# Patient Record
Sex: FEMALE | Race: WHITE | NOT HISPANIC OR LATINO | URBAN - METROPOLITAN AREA
[De-identification: names, ages, dates, MRNs, and addresses within clinical notes are randomized per-mention and may not be internally consistent; named-entity substitution may affect disease eponyms.]

---

## 2020-07-21 ENCOUNTER — LAB OUTSIDE AN ENCOUNTER (OUTPATIENT)
Dept: URBAN - METROPOLITAN AREA TELEHEALTH 2 | Facility: TELEHEALTH | Age: 65
End: 2020-07-21

## 2020-07-21 ENCOUNTER — OFFICE VISIT (OUTPATIENT)
Dept: URBAN - METROPOLITAN AREA TELEHEALTH 2 | Facility: TELEHEALTH | Age: 65
End: 2020-07-21
Payer: MEDICARE

## 2020-07-21 ENCOUNTER — OFFICE VISIT (OUTPATIENT)
Dept: URBAN - METROPOLITAN AREA CLINIC 40 | Facility: CLINIC | Age: 65
End: 2020-07-21

## 2020-07-21 DIAGNOSIS — Z86.010 HISTORY OF COLON POLYPS: ICD-10-CM

## 2020-07-21 DIAGNOSIS — K76.0 FATTY LIVER: ICD-10-CM

## 2020-07-21 PROCEDURE — G8417 CALC BMI ABV UP PARAM F/U: HCPCS | Performed by: INTERNAL MEDICINE

## 2020-07-21 PROCEDURE — 99203 OFFICE O/P NEW LOW 30 MIN: CPT | Performed by: INTERNAL MEDICINE

## 2020-07-21 PROCEDURE — 3017F COLORECTAL CA SCREEN DOC REV: CPT | Performed by: INTERNAL MEDICINE

## 2020-07-21 PROCEDURE — 1036F TOBACCO NON-USER: CPT | Performed by: INTERNAL MEDICINE

## 2020-07-21 PROCEDURE — G9903 PT SCRN TBCO ID AS NON USER: HCPCS | Performed by: INTERNAL MEDICINE

## 2020-07-21 PROCEDURE — G8427 DOCREV CUR MEDS BY ELIG CLIN: HCPCS | Performed by: INTERNAL MEDICINE

## 2020-07-21 RX ORDER — SODIUM, POTASSIUM,MAG SULFATES 17.5-3.13G
AS DIRECTED SOLUTION, RECONSTITUTED, ORAL ORAL
OUTPATIENT
Start: 2020-07-21

## 2020-07-21 NOTE — HPI-TODAY'S VISIT:
Patient has a history of colon polyps, and returns now to schedule her surveillance colonoscopy.  She underwent her last colonoscopy on 10/11/2010 where a small hyperplastic sigmoid polyp was removed, and sigmoid diverticulosis was found.  Overall she has been feeling very well she denies abdominal pain, and her bowel movements have been normal.  She denies any rectal bleeding or family history of colon cancer.  She states in the past she was diagnosed with a fatty liver.  Review of recent lab work from 3/9/2020 shows a normal liver panel with an AST 23 ALT 24 alkaline phosphatase 49 and a bilirubin of 0.4.  She has had no recent imaging studies.

## 2020-08-11 ENCOUNTER — TELEPHONE ENCOUNTER (OUTPATIENT)
Dept: URBAN - METROPOLITAN AREA CLINIC 40 | Facility: CLINIC | Age: 65
End: 2020-08-11

## 2020-08-17 ENCOUNTER — OFFICE VISIT (OUTPATIENT)
Dept: URBAN - METROPOLITAN AREA SURGERY CENTER 30 | Facility: SURGERY CENTER | Age: 65
End: 2020-08-17

## 2020-08-17 ENCOUNTER — OFFICE VISIT (OUTPATIENT)
Dept: URBAN - METROPOLITAN AREA SURGERY CENTER 30 | Facility: SURGERY CENTER | Age: 65
End: 2020-08-17
Payer: MEDICARE

## 2020-08-17 DIAGNOSIS — Z86.010 H/O ADENOMATOUS POLYP OF COLON: ICD-10-CM

## 2020-08-17 PROCEDURE — G9936 PMH PLYP/NEO CO/RECT/JUN/ANS: HCPCS | Performed by: INTERNAL MEDICINE

## 2020-08-17 PROCEDURE — G8907 PT DOC NO EVENTS ON DISCHARG: HCPCS | Performed by: INTERNAL MEDICINE

## 2020-08-17 PROCEDURE — G0105 COLORECTAL SCRN; HI RISK IND: HCPCS | Performed by: INTERNAL MEDICINE

## 2020-09-01 ENCOUNTER — DASHBOARD ENCOUNTERS (OUTPATIENT)
Age: 65
End: 2020-09-01

## 2020-09-01 ENCOUNTER — OFFICE VISIT (OUTPATIENT)
Dept: URBAN - METROPOLITAN AREA TELEHEALTH 2 | Facility: TELEHEALTH | Age: 65
End: 2020-09-01
Payer: MEDICARE

## 2020-09-01 DIAGNOSIS — Z86.010 HISTORY OF COLON POLYPS: ICD-10-CM

## 2020-09-01 DIAGNOSIS — K76.0 FATTY LIVER: ICD-10-CM

## 2020-09-01 PROBLEM — 197321007: Status: ACTIVE | Noted: 2020-07-21

## 2020-09-01 PROBLEM — 428283002: Status: ACTIVE | Noted: 2020-07-21

## 2020-09-01 PROCEDURE — 1036F TOBACCO NON-USER: CPT | Performed by: INTERNAL MEDICINE

## 2020-09-01 PROCEDURE — 99213 OFFICE O/P EST LOW 20 MIN: CPT | Performed by: INTERNAL MEDICINE

## 2020-09-01 PROCEDURE — G9903 PT SCRN TBCO ID AS NON USER: HCPCS | Performed by: INTERNAL MEDICINE

## 2020-09-01 PROCEDURE — 3017F COLORECTAL CA SCREEN DOC REV: CPT | Performed by: INTERNAL MEDICINE

## 2020-09-01 PROCEDURE — G8427 DOCREV CUR MEDS BY ELIG CLIN: HCPCS | Performed by: INTERNAL MEDICINE

## 2020-09-01 PROCEDURE — G8417 CALC BMI ABV UP PARAM F/U: HCPCS | Performed by: INTERNAL MEDICINE

## 2020-09-01 RX ORDER — SODIUM, POTASSIUM,MAG SULFATES 17.5-3.13G
AS DIRECTED SOLUTION, RECONSTITUTED, ORAL ORAL
Status: ACTIVE | COMMUNITY
Start: 2020-07-21

## 2020-09-01 NOTE — HPI-TODAY'S VISIT:
Patient has a history of colon polyps, and returns now to schedule her surveillance colonoscopy.  She underwent her last colonoscopy on 10/11/2010 where a small hyperplastic sigmoid polyp was removed, and sigmoid diverticulosis was found.  Overall she has been feeling very well she denies abdominal pain, and her bowel movements have been normal.  She denies any rectal bleeding or family history of colon cancer.  She states in the past she was diagnosed with a fatty liver.  Review of recent lab work from 3/9/2020 shows a normal liver panel with an AST 23 ALT 24 alkaline phosphatase 49 and a bilirubin of 0.4.  She has had no recent imaging studies. Patient underwent her colonoscopy on 8/17/2020 where only few sigmoid diverticula  were present.  She underwent a right upper quadrant ultrasound on 8/14/2020 which showed hepatic steatosis, was otherwise normal patient continues to feel fine, denies abdominal pain, her bowel movements are normal.  She continues to try to lose weight as advised.

## 2025-01-02 ENCOUNTER — OFFICE VISIT (OUTPATIENT)
Dept: URBAN - METROPOLITAN AREA CLINIC 40 | Facility: CLINIC | Age: 70
End: 2025-01-02

## 2025-01-02 ENCOUNTER — LAB OUTSIDE AN ENCOUNTER (OUTPATIENT)
Dept: URBAN - METROPOLITAN AREA CLINIC 40 | Facility: CLINIC | Age: 70
End: 2025-01-02

## 2025-01-02 VITALS
DIASTOLIC BLOOD PRESSURE: 80 MMHG | SYSTOLIC BLOOD PRESSURE: 138 MMHG | HEART RATE: 85 BPM | BODY MASS INDEX: 27.11 KG/M2 | WEIGHT: 153 LBS | HEIGHT: 63 IN | TEMPERATURE: 98.7 F

## 2025-01-02 DIAGNOSIS — R19.7 DIARRHEA, UNSPECIFIED TYPE: ICD-10-CM

## 2025-01-02 DIAGNOSIS — K76.0 FATTY LIVER: ICD-10-CM

## 2025-01-02 DIAGNOSIS — Z86.0102 HISTORY OF HYPERPLASTIC POLYP OF COLON: ICD-10-CM

## 2025-01-02 PROCEDURE — 99204 OFFICE O/P NEW MOD 45 MIN: CPT | Performed by: INTERNAL MEDICINE

## 2025-01-02 RX ORDER — METFORMIN HYDROCHLORIDE 500 MG/1
1 TABLET WITH EVENING MEAL TABLET, EXTENDED RELEASE ORAL ONCE A DAY
Status: ACTIVE | COMMUNITY

## 2025-01-02 RX ORDER — SODIUM, POTASSIUM,MAG SULFATES 17.5-3.13G
AS DIRECTED SOLUTION, RECONSTITUTED, ORAL ORAL
Status: ON HOLD | COMMUNITY
Start: 2020-07-21

## 2025-01-02 RX ORDER — MELATONIN 5 MG
1 TABLET IN THE EVENING TABLET ORAL ONCE A DAY
Status: ACTIVE | COMMUNITY

## 2025-01-02 RX ORDER — COLESTIPOL HYDROCHLORIDE 1 G/1
1 TABLET TABLET, FILM COATED ORAL TWICE DAILY
Qty: 180 | Refills: 3 | OUTPATIENT
Start: 2025-01-02

## 2025-01-02 RX ORDER — GABAPENTIN 100 MG/1
1 CAPSULE AT BEDTIME CAPSULE ORAL ONCE A DAY
Status: ACTIVE | COMMUNITY

## 2025-01-02 RX ORDER — OMEPRAZOLE 20 MG/1
1 CAPSULE 1/2 TO 1 HOUR BEFORE MORNING MEAL CAPSULE, DELAYED RELEASE ORAL ONCE A DAY
Status: ACTIVE | COMMUNITY

## 2025-01-02 RX ORDER — LOSARTAN POTASSIUM 50 MG/1
1 TABLET TABLET, FILM COATED ORAL ONCE A DAY
Status: ACTIVE | COMMUNITY

## 2025-01-02 RX ORDER — FENOFIBRATE 145 MG/1
1 TABLET TABLET, FILM COATED ORAL ONCE A DAY
Status: ACTIVE | COMMUNITY

## 2025-01-02 NOTE — HPI-TODAY'S VISIT:
Patient has a history of colon polyps, and returns now to schedule her surveillance colonoscopy.  She underwent her last colonoscopy on 10/11/2010 where a small hyperplastic sigmoid polyp was removed, and sigmoid diverticulosis was found.  Overall she has been feeling very well she denies abdominal pain, and her bowel movements have been normal.  She denies any rectal bleeding or family history of colon cancer.  She states in the past she was diagnosed with a fatty liver.  Review of recent lab work from 3/9/2020 shows a normal liver panel with an AST 23 ALT 24 alkaline phosphatase 49 and a bilirubin of 0.4.  She has had no recent imaging studies. Patient underwent her colonoscopy on 8/17/2020 where only few sigmoid diverticula  were present.  She underwent a right upper quadrant ultrasound on 8/14/2020 which showed hepatic steatosis, was otherwise normal patient continues to feel fine, denies abdominal pain, her bowel movements are normal.  She continues to try to lose weight as advised. Patient presents for evaluation on 1/2/2025.  Overall she has been feeling well from a GI standpoint.  She does complain of diarrhea which she will average 6-7 loose to watery stools per day.  She has attributed this to her diabetic medication which has recently been changed from metformin ER.  To Ozempic.  She has a history of a hyperplastic polyp, and underwent her last colonoscopy in August 2020 with sigmoid diverticulosis was found.  She denies any significant heartburn or reflux, has no swallowing issues she denies abdominal pain.  She is concerned about her diagnosis of fatty liver, which was seen on right upper quadrant ultrasound in August 2020.  She does have a history of type 2 diabetes and hyperlipidemia.  She did undergo a CMP on 9/23/2024 which showed a normal liver panel.

## 2025-01-03 LAB
ABSOLUTE BASOPHILS: 52
ABSOLUTE EOSINOPHILS: 207
ABSOLUTE LYMPHOCYTES: 2242
ABSOLUTE MONOCYTES: 525
ABSOLUTE NEUTROPHILS: 4373
ALBUMIN/GLOBULIN RATIO: 2
ALBUMIN: 4.5
ALKALINE PHOSPHATASE: 19
ALT: 22
AST: 19
BASOPHILS: 0.7
BILIRUBIN, TOTAL: 0.3
BUN/CREATININE RATIO: 15
CALCIUM: 9.8
CARBON DIOXIDE: 26
CHLORIDE: 102
CREATININE: 1.07
EGFR: 56
EOSINOPHILS: 2.8
FIB 4 INDEX: 0.7
FIB 4 INTERPRETATION: (no result)
GLOBULIN: 2.2
GLUCOSE: 88
HEMATOCRIT: 38.1
HEMOGLOBIN: 12.6
LYMPHOCYTES: 30.3
MCH: 29.5
MCHC: 33.1
MCV: 89.2
MONOCYTES: 7.1
MPV: 9.8
NEUTROPHILS: 59.1
PLATELET COUNT: 399
PLATELET COUNT: 399
POTASSIUM: 4.3
PROTEIN, TOTAL: 6.7
RDW: 12.8
RED BLOOD CELL COUNT: 4.27
SODIUM: 140
UREA NITROGEN (BUN): 16
WHITE BLOOD CELL COUNT: 7.4

## 2025-02-12 ENCOUNTER — OFFICE VISIT (OUTPATIENT)
Dept: URBAN - METROPOLITAN AREA CLINIC 39 | Facility: CLINIC | Age: 70
End: 2025-02-12

## 2025-02-12 ENCOUNTER — TELEPHONE ENCOUNTER (OUTPATIENT)
Dept: URBAN - METROPOLITAN AREA CLINIC 40 | Facility: CLINIC | Age: 70
End: 2025-02-12

## 2025-02-12 DIAGNOSIS — K76.0 FATTY (CHANGE OF) LIVER: ICD-10-CM

## 2025-02-12 PROCEDURE — 76705 ECHO EXAM OF ABDOMEN: CPT | Performed by: INTERNAL MEDICINE

## 2025-04-03 ENCOUNTER — OFFICE VISIT (OUTPATIENT)
Dept: URBAN - METROPOLITAN AREA CLINIC 40 | Facility: CLINIC | Age: 70
End: 2025-04-03
Payer: COMMERCIAL

## 2025-04-03 DIAGNOSIS — K76.0 FATTY LIVER: ICD-10-CM

## 2025-04-03 DIAGNOSIS — R19.7 DIARRHEA, UNSPECIFIED TYPE: ICD-10-CM

## 2025-04-03 DIAGNOSIS — Z86.0102 HISTORY OF HYPERPLASTIC POLYP OF COLON: ICD-10-CM

## 2025-04-03 PROCEDURE — 99213 OFFICE O/P EST LOW 20 MIN: CPT | Performed by: INTERNAL MEDICINE

## 2025-04-03 RX ORDER — MELATONIN 5 MG
1 TABLET IN THE EVENING TABLET ORAL ONCE A DAY
Status: ON HOLD | COMMUNITY

## 2025-04-03 RX ORDER — COLESTIPOL HYDROCHLORIDE 1 G/1
1 TABLET TABLET, FILM COATED ORAL TWICE DAILY
Qty: 180 | Refills: 3 | OUTPATIENT
Start: 2025-03-29

## 2025-04-03 RX ORDER — SODIUM, POTASSIUM,MAG SULFATES 17.5-3.13G
AS DIRECTED SOLUTION, RECONSTITUTED, ORAL ORAL
Status: ON HOLD | COMMUNITY
Start: 2020-07-21

## 2025-04-03 RX ORDER — LOSARTAN POTASSIUM 50 MG/1
1 TABLET TABLET, FILM COATED ORAL ONCE A DAY
Status: ACTIVE | COMMUNITY

## 2025-04-03 RX ORDER — COLESTIPOL HYDROCHLORIDE 1 G/1
1 TABLET TABLET, FILM COATED ORAL TWICE DAILY
Qty: 180 | Refills: 3 | Status: ON HOLD | COMMUNITY
Start: 2025-01-02

## 2025-04-03 RX ORDER — FENOFIBRATE 145 MG/1
1 TABLET TABLET, FILM COATED ORAL ONCE A DAY
Status: ACTIVE | COMMUNITY

## 2025-04-03 RX ORDER — METFORMIN HYDROCHLORIDE 500 MG/1
1 TABLET WITH EVENING MEAL TABLET, EXTENDED RELEASE ORAL ONCE A DAY
Status: ON HOLD | COMMUNITY

## 2025-04-03 RX ORDER — OMEPRAZOLE 20 MG/1
1 CAPSULE 1/2 TO 1 HOUR BEFORE MORNING MEAL CAPSULE, DELAYED RELEASE ORAL ONCE A DAY
Status: ACTIVE | COMMUNITY

## 2025-04-03 RX ORDER — GABAPENTIN 100 MG/1
1 CAPSULE AT BEDTIME CAPSULE ORAL ONCE A DAY
Status: ACTIVE | COMMUNITY

## 2025-04-03 NOTE — HPI-TODAY'S VISIT:
Patient has a history of colon polyps, and returns now to schedule her surveillance colonoscopy.  She underwent her last colonoscopy on 10/11/2010 where a small hyperplastic sigmoid polyp was removed, and sigmoid diverticulosis was found.  Overall she has been feeling very well she denies abdominal pain, and her bowel movements have been normal.  She denies any rectal bleeding or family history of colon cancer.  She states in the past she was diagnosed with a fatty liver.  Review of recent lab work from 3/9/2020 shows a normal liver panel with an AST 23 ALT 24 alkaline phosphatase 49 and a bilirubin of 0.4.  She has had no recent imaging studies. Patient underwent her colonoscopy on 8/17/2020 where only few sigmoid diverticula  were present.  She underwent a right upper quadrant ultrasound on 8/14/2020 which showed hepatic steatosis, was otherwise normal patient continues to feel fine, denies abdominal pain, her bowel movements are normal.  She continues to try to lose weight as advised. Patient presents for evaluation on 1/2/2025.  Overall she has been feeling well from a GI standpoint.  She does complain of diarrhea which she will average 6-7 loose to watery stools per day.  She has attributed this to her diabetic medication which has recently been changed from metformin ER.  To Ozempic.  She has a history of a hyperplastic polyp, and underwent her last colonoscopy in August 2020 with sigmoid diverticulosis was found.  She denies any significant heartburn or reflux, has no swallowing issues she denies abdominal pain.  She is concerned about her diagnosis of fatty liver, which was seen on right upper quadrant ultrasound in August 2020.  She does have a history of type 2 diabetes and hyperlipidemia.  She did undergo a CMP on 9/23/2024 which showed a normal liver panel. Patient returns for follow-up on 4/3/2025.  On 1/2/2025 patient underwent lab work which included a fib 4 index of 0.7 consistent with the absence of advanced liver fibrosis her CMP is essentially normal CBC was within normal limits Her GPP 14 done on 1/14/2025 was all negative with a normal pancreatic elastase Overall patient is feeling very well from a GI standpoint.  She has no significant heartburn or reflux, and is no longer having diarrhea she states that since discontinuing the metformin, if anything she has been more constipated.  Therefore she is no longer using colestipol.  She did undergo a right upper quadrant ultrasound on 2/12/2025 which showed diffuse steatosis but was otherwise unremarkable.  I reviewed with the patient her fib 4 index of 0.7, consistent with the absence of advanced liver fibrosis.  In addition her CBC CMP and GPP were all normal, including a normal pancreatic elastase.

## 2025-08-18 ENCOUNTER — TELEPHONE ENCOUNTER (OUTPATIENT)
Dept: URBAN - METROPOLITAN AREA SURGERY CENTER 30 | Facility: SURGERY CENTER | Age: 70
End: 2025-08-18

## 2025-08-18 ENCOUNTER — TELEPHONE ENCOUNTER (OUTPATIENT)
Dept: URBAN - METROPOLITAN AREA CLINIC 40 | Facility: CLINIC | Age: 70
End: 2025-08-18